# Patient Record
Sex: FEMALE | Race: WHITE | NOT HISPANIC OR LATINO | Employment: OTHER | RURAL
[De-identification: names, ages, dates, MRNs, and addresses within clinical notes are randomized per-mention and may not be internally consistent; named-entity substitution may affect disease eponyms.]

---

## 2020-11-12 ENCOUNTER — HISTORICAL (OUTPATIENT)
Dept: ADMINISTRATIVE | Facility: HOSPITAL | Age: 75
End: 2020-11-12

## 2021-08-02 ENCOUNTER — OFFICE VISIT (OUTPATIENT)
Dept: DERMATOLOGY | Facility: CLINIC | Age: 76
End: 2021-08-02
Payer: MEDICARE

## 2021-08-02 VITALS — WEIGHT: 125 LBS | HEIGHT: 62 IN | BODY MASS INDEX: 23 KG/M2 | RESPIRATION RATE: 18 BRPM

## 2021-08-02 DIAGNOSIS — L30.4 INTERTRIGO: ICD-10-CM

## 2021-08-02 DIAGNOSIS — L21.9 SEBORRHEIC DERMATITIS: ICD-10-CM

## 2021-08-02 DIAGNOSIS — L82.0 SEBORRHEIC KERATOSES, INFLAMED: ICD-10-CM

## 2021-08-02 DIAGNOSIS — L57.8 OTHER SKIN CHANGES DUE TO CHRONIC EXPOSURE TO NONIONIZING RADIATION: Primary | ICD-10-CM

## 2021-08-02 DIAGNOSIS — L85.3 XEROSIS OF SKIN: ICD-10-CM

## 2021-08-02 DIAGNOSIS — L82.1 SEBORRHEIC KERATOSES: ICD-10-CM

## 2021-08-02 DIAGNOSIS — Z85.828 HISTORY OF BASAL CELL CARCINOMA OF SKIN: ICD-10-CM

## 2021-08-02 PROCEDURE — 17110 DESTRUCTION B9 LES UP TO 14: CPT | Mod: ,,, | Performed by: DERMATOLOGY

## 2021-08-02 PROCEDURE — 17110 PR DESTRUCTION BENIGN LESIONS UP TO 14: ICD-10-PCS | Mod: ,,, | Performed by: DERMATOLOGY

## 2021-08-02 PROCEDURE — 99213 OFFICE O/P EST LOW 20 MIN: CPT | Mod: 25,,, | Performed by: DERMATOLOGY

## 2021-08-02 PROCEDURE — 99213 PR OFFICE/OUTPT VISIT, EST, LEVL III, 20-29 MIN: ICD-10-PCS | Mod: 25,,, | Performed by: DERMATOLOGY

## 2021-08-02 RX ORDER — DESONIDE 0.5 MG/G
CREAM TOPICAL
Qty: 60 G | Refills: 1 | Status: SHIPPED | OUTPATIENT
Start: 2021-08-02 | End: 2023-07-31 | Stop reason: SDUPTHER

## 2021-08-02 RX ORDER — KETOCONAZOLE 20 MG/ML
SHAMPOO, SUSPENSION TOPICAL
Qty: 120 ML | Refills: 11 | Status: SHIPPED | OUTPATIENT
Start: 2021-08-02 | End: 2023-03-01 | Stop reason: SDUPTHER

## 2022-01-10 ENCOUNTER — HOSPITAL ENCOUNTER (OUTPATIENT)
Dept: RADIOLOGY | Facility: HOSPITAL | Age: 77
Discharge: HOME | End: 2022-01-10
Payer: MEDICARE

## 2022-01-10 VITALS — WEIGHT: 130 LBS | HEIGHT: 62 IN | BODY MASS INDEX: 23.92 KG/M2

## 2022-01-10 DIAGNOSIS — Z12.31 VISIT FOR SCREENING MAMMOGRAM: ICD-10-CM

## 2022-01-10 PROCEDURE — 77067 SCR MAMMO BI INCL CAD: CPT | Mod: TC

## 2022-01-10 PROCEDURE — 77067 MAMMO DIGITAL SCREENING BILAT: ICD-10-PCS | Mod: 26,,, | Performed by: RADIOLOGY

## 2022-01-10 PROCEDURE — 77067 SCR MAMMO BI INCL CAD: CPT | Mod: 26,,, | Performed by: RADIOLOGY

## 2022-02-02 ENCOUNTER — OFFICE VISIT (OUTPATIENT)
Dept: DERMATOLOGY | Facility: CLINIC | Age: 77
End: 2022-02-02
Payer: MEDICARE

## 2022-02-02 VITALS — RESPIRATION RATE: 18 BRPM | WEIGHT: 130.06 LBS | BODY MASS INDEX: 23.93 KG/M2 | HEIGHT: 62 IN

## 2022-02-02 DIAGNOSIS — L21.9 SEBORRHEIC DERMATITIS: ICD-10-CM

## 2022-02-02 DIAGNOSIS — L57.0 AK (ACTINIC KERATOSIS): ICD-10-CM

## 2022-02-02 DIAGNOSIS — L82.0 SEBORRHEIC KERATOSES, INFLAMED: ICD-10-CM

## 2022-02-02 DIAGNOSIS — Z85.828 HISTORY OF BASAL CELL CARCINOMA (BCC): ICD-10-CM

## 2022-02-02 DIAGNOSIS — L82.1 SK (SEBORRHEIC KERATOSIS): ICD-10-CM

## 2022-02-02 DIAGNOSIS — L57.8 OTHER SKIN CHANGES DUE TO CHRONIC EXPOSURE TO NONIONIZING RADIATION: Primary | ICD-10-CM

## 2022-02-02 PROCEDURE — 17110 PR DESTRUCTION BENIGN LESIONS UP TO 14: ICD-10-PCS | Mod: ,,, | Performed by: DERMATOLOGY

## 2022-02-02 PROCEDURE — 99213 PR OFFICE/OUTPT VISIT, EST, LEVL III, 20-29 MIN: ICD-10-PCS | Mod: 25,,, | Performed by: DERMATOLOGY

## 2022-02-02 PROCEDURE — 99213 OFFICE O/P EST LOW 20 MIN: CPT | Mod: 25,,, | Performed by: DERMATOLOGY

## 2022-02-02 PROCEDURE — 17110 DESTRUCTION B9 LES UP TO 14: CPT | Mod: ,,, | Performed by: DERMATOLOGY

## 2022-02-02 NOTE — PATIENT INSTRUCTIONS
Sunscreen Recommendations   I recommended a broad spectrum sunscreen with a SPF of 30 or higher that is water-resistant. SPF 30 sunscreens block approximately 97 percent of the sun's harmful rays.    Sunscreens should be applied at least 15 minutes prior to expected sun exposure and then every 90 minutes after that as long as sun exposure continues.    If swimming or exercising sunscreen should be reapplied every 45 minutes to an hour after getting wet or sweating.   One ounce, or the equivalent of a shot glass full of sunscreen, is adequate to protect the skin not covered by a bathing suit.    I also recommended a lip balm with a sunscreen as well.   Healthy Sun Protective Behaviors   Sun protective clothing can be used in lieu of sunscreen but must be worn the entire time you are exposed to the sun's rays.   Seek shade between 10 a.m. and 2 p.m.   Use extra caution near water, snow, or sand as they reflect sun rays   Avoid tanning beds and consider sunless self-tanning products instead   Perform monthly self skin exams       Cryotherapy   There will likely be a blister.    Clean the area daily with dial antibacterial soap and water.    Apply vaseline as needed.    The area will take 1-2 weeks to heal.

## 2022-02-02 NOTE — PROGRESS NOTES
[facility]   Meena Goss MD    Patient Name: Isabel Boateng  Patient YOB: 1945   Date of Service: 2/2/22    CC: Full Skin Exam    HPI: Isabel Boateng is a 76 y.o. female presents today for a full skin exam.  Patient was last seen 08/21 and dermatologic history includes AKs and BCC. Patient is concerned today about a lesion located on the chest.  It has been present for 2 year(s). It has not been treated in the past.  Patient is also concerned about lesion on the back.    History reviewed. No pertinent past medical history.  Past Surgical History:   Procedure Laterality Date    BASAL CELL CARCINOMA EXCISION      HYSTERECTOMY      OOPHORECTOMY       Review of patient's allergies indicates:   Allergen Reactions    Streptomycin        Current Outpatient Medications:     desonide (DESOWEN) 0.05 % cream, Apply to rash under breast BID, tapering with improvement, Disp: 60 g, Rfl: 1    ketoconazole (NIZORAL) 2 % shampoo, Use as a scalp treatment 2-3 times a week for maintenance, massaging into scalp and leaving on for up to 3 minutes before rinsing, Disp: 120 mL, Rfl: 11    ROS: A focused review of systems was obtained and negative.     Exam: A full skin exam was performed including scalp, hair, face, neck, chest, back, abdomen, right arm, left arm, right hand, left hand, nails, right leg, and left leg.  All areas examined were normal expect as per below in assessment and plan.  General Appearance of the patient is well developed and well nourished.  Orientation: alert and oriented x 3.  Mood and affect: pleasant.    Assessment:   The primary encounter diagnosis was Other skin changes due to chronic exposure to nonionizing radiation. Diagnoses of SK (seborrheic keratosis), AK (actinic keratosis), Seborrheic dermatitis, History of basal cell carcinoma (BCC), and Seborrheic keratoses, inflamed were also pertinent to this visit.    Plan:      Seborrheic Keratosis (L82.1)  - Stuck-on, warty, greasy  brown papule with pseudo-horn cysts scattered on the trunk and extremities    Plan: Counseling.  I counseled the patient regarding the following:  Skin Care: Seborrheic Keratoses are benign. No treatment is necessary.  Expectations: Seborrheic Keratoses are benign warty growths. Patients get more of them as they age    Plan: Reassurance    Irritated Seborrheic Keratoses (L82.0)  Stuck-on inflamed papules with crust located on the chest  Associated diagnoses: Pruritus and Cutaneous Inflammation    Plan: Liquid Nitrogen.  A total of 2 lesions were treated with liquid nitrogen, located on the above listed location.  This procedure was medically necessary because the lesions that were treated were: irritated and itchy. The  patient's consent was obtained including but not limited to risks of crusting, scabbing, blistering, scarring, darker  or lighter pigmentary change, recurrence, incomplete removal and infection.      History of non-melanoma skin cancer (Z85.828)  - Well healed scar with NER  Associated diagnosis: Medical surveillance following completed treatment    Plan: Monitoring.  Other Skin Changes Due to Chronic Exposure of Nonionizing Radiation (L57.8)    Plan: Monitoring.     Plan: Sunscreen Recommendations.  I recommended a broad spectrum sunscreen with a SPF of 30 or higher. I explained that SPF 30 sunscreens block approximately 97 percent of the  sun's harmful rays. Sunscreens should be applied at least 15 minutes prior to expected sun exposure and then every 2 hours after that as long as  sun exposure continues. If swimming or exercising sunscreen should be reapplied every 45 minutes to an hour after getting wet or sweating. One  ounce, or the equivalent of a shot glass full of sunscreen, is adequate to protect the skin not covered by a bathing suit. I also recommended a lip  balm with a sunscreen as well. Sun protective clothing can be used in lieu of sunscreen but must be worn the entire time you are  exposed to the  sun's rays.      Follow up in about 6 months (around 8/2/2022) for Skin check.    Meena Goss MD

## 2022-02-12 ENCOUNTER — OFFICE VISIT (OUTPATIENT)
Dept: FAMILY MEDICINE | Facility: CLINIC | Age: 77
End: 2022-02-12
Payer: MEDICARE

## 2022-02-12 VITALS — BODY MASS INDEX: 23.92 KG/M2 | WEIGHT: 130 LBS | HEIGHT: 62 IN

## 2022-02-12 DIAGNOSIS — R53.83 FATIGUE, UNSPECIFIED TYPE: ICD-10-CM

## 2022-02-12 DIAGNOSIS — U07.1 COVID-19 VIRUS INFECTION: Primary | ICD-10-CM

## 2022-02-12 LAB
CTP QC/QA: YES
SARS-COV-2 AG RESP QL IA.RAPID: POSITIVE

## 2022-02-12 PROCEDURE — 99202 OFFICE O/P NEW SF 15 MIN: CPT | Mod: CR,,, | Performed by: NURSE PRACTITIONER

## 2022-02-12 PROCEDURE — 99202 PR OFFICE/OUTPT VISIT, NEW, LEVL II, 15-29 MIN: ICD-10-PCS | Mod: CR,,, | Performed by: NURSE PRACTITIONER

## 2022-02-12 PROCEDURE — 87426 SARSCOV CORONAVIRUS AG IA: CPT | Mod: RHCUB | Performed by: NURSE PRACTITIONER

## 2022-02-12 RX ORDER — CITALOPRAM 10 MG/1
10 TABLET ORAL DAILY
COMMUNITY

## 2022-02-12 RX ORDER — OMEPRAZOLE 20 MG/1
20 CAPSULE, DELAYED RELEASE ORAL DAILY
COMMUNITY

## 2022-02-12 NOTE — PATIENT INSTRUCTIONS
 Isolate at home for 5 days, except to seek medical attention. If you have no symptoms or your symptoms are resolving, you can leave your house    Continue to wear a well-fitting mask when around others for additional five days after isolation.   If you have fever (temperature 100.4 or greater), stay home until your fever resolves.   Stay in a specific room away from other people in your home. If possible, use a separate bathroom. If you must be around others, wear a mask.   Monitor your symptoms carefully. Go to the emergency room if you start showing any of these signs: trouble breathing, persistent pain or pressure in chest, confusion, difficulty staying awake or inability to wake, pale/gray/blue-colored skin, lips or nail beds.   Get rest and stay hydrated.   If you have a doctors appointment, informed you healthcare provider ahead of time that you have COVID 19.   OTC Medications  o Ibuprofen or acetaminophen as instructed for fever (temperature 100.4 and greater), and body aches/headaches.  o Aspirin 81 mg oral daiy.  o Adult multivitamin with vitamin C & D and zinc.

## 2022-02-12 NOTE — PROGRESS NOTES
[facility]    Chief Complaint      Chief Complaint   Patient presents with    Cough    Fatigue    Headache    Nasal Congestion     Took home covid test this am and it was positive      History of Present Illness      Isabel Boateng is a 76 y.o. female  who presents today for c/o URI symptoms. She reports that she had a (+) COVID 19 home test.    URI   This is a new problem. The current episode started in the past 7 days. The problem has been waxing and waning. There has been no fever. Associated symptoms include congestion, coughing and headaches. Pertinent negatives include no abdominal pain, chest pain, diarrhea, dysuria, ear pain, nausea, plugged ear sensation, rash, rhinorrhea, sinus pain, sneezing, sore throat, vomiting or wheezing. She has tried nothing for the symptoms.     Past Medical History:  History reviewed. No pertinent past medical history.    Past Surgical History:   has a past surgical history that includes Excision basal cell carcinoma; Oophorectomy; and Hysterectomy.    Social History:  Social History     Tobacco Use    Smoking status: Never Smoker    Smokeless tobacco: Never Used     I personally reviewed all past medical, surgical, and social.     Review of Systems   Constitutional: Positive for fatigue and malaise/fatigue. Negative for chills and fever.   HENT: Positive for congestion. Negative for ear pain, rhinorrhea, sinus pain, sneezing and sore throat.    Eyes: Negative for discharge.   Respiratory: Positive for cough. Negative for sputum production, shortness of breath and wheezing.    Cardiovascular: Negative for chest pain.   Gastrointestinal: Negative for abdominal pain, diarrhea, nausea and vomiting.   Genitourinary: Negative for dysuria.   Musculoskeletal: Negative for myalgias.   Skin: Negative for itching and rash.   Neurological: Positive for headaches.   Endo/Heme/Allergies: Negative for environmental allergies. Does not bruise/bleed easily.  "  Psychiatric/Behavioral: Negative for depression and suicidal ideas.      Medications:  Outpatient Encounter Medications as of 2/12/2022   Medication Sig Dispense Refill    citalopram (CELEXA) 10 MG tablet Take 10 mg by mouth once daily.      desonide (DESOWEN) 0.05 % cream Apply to rash under breast BID, tapering with improvement 60 g 1    ketoconazole (NIZORAL) 2 % shampoo Use as a scalp treatment 2-3 times a week for maintenance, massaging into scalp and leaving on for up to 3 minutes before rinsing 120 mL 11    omeprazole (PRILOSEC) 20 MG capsule Take 20 mg by mouth once daily.       No facility-administered encounter medications on file as of 2/12/2022.     Allergies:  Review of patient's allergies indicates:   Allergen Reactions    Streptomycin      Health Maintenance:    There is no immunization history on file for this patient.   Health Maintenance   Topic Date Due    Hepatitis C Screening  Never done    Lipid Panel  Never done    TETANUS VACCINE  Never done    DEXA SCAN  Never done      Physical Exam      Height and Weight  Height: 5' 2" (157.5 cm)  Weight: 59 kg (130 lb)  BSA (Calculated - sq m): 1.61 sq meters  BMI (Calculated): 23.8  Weight in (lb) to have BMI = 25: 136.4]    Physical Exam  Vitals and nursing note reviewed.   Constitutional:       General: She is not in acute distress.     Appearance: Normal appearance.   HENT:      Head: Normocephalic and atraumatic.      Right Ear: External ear normal.      Left Ear: External ear normal.      Nose: Congestion present.      Mouth/Throat:      Mouth: Mucous membranes are moist.      Pharynx: Oropharynx is clear.   Eyes:      Conjunctiva/sclera: Conjunctivae normal.      Pupils: Pupils are equal, round, and reactive to light.   Cardiovascular:      Rate and Rhythm: Normal rate and regular rhythm.      Pulses: Normal pulses.      Heart sounds: Normal heart sounds. No murmur heard.      Pulmonary:      Effort: Pulmonary effort is normal. No " respiratory distress.      Breath sounds: Normal breath sounds.   Musculoskeletal:         General: Normal range of motion.      Cervical back: Normal range of motion and neck supple.   Skin:     General: Skin is warm and dry.   Neurological:      General: No focal deficit present.      Mental Status: She is alert and oriented to person, place, and time. Mental status is at baseline.   Psychiatric:         Mood and Affect: Mood normal.         Behavior: Behavior normal.         Thought Content: Thought content normal.         Judgment: Judgment normal.        Assessment/Plan     Isabel Boateng is a 76 y.o.female with:    1. Fatigue, unspecified type  - SARS Coronavirus 2 Antigen, POCT    2. COVID-19 virus infection  COVID 19 Complications Score: 1 (Low Risk)   OTC Medications  o Ibuprofen or acetaminophen as instructed for fever (temperature 100.4 and greater), and body aches/headaches.  o Aspirin 81 mg oral daiy.  o Adult multivitamin with vitamin C & D and zinc.    Chronic conditions status updated as per HPI.  Other than changes above, cont current medications and maintain follow up with specialists.  Return to clinic as needed.    Francia Strange, KELLIEP  [facility]

## 2022-07-28 ENCOUNTER — OFFICE VISIT (OUTPATIENT)
Dept: DERMATOLOGY | Facility: CLINIC | Age: 77
End: 2022-07-28
Payer: MEDICARE

## 2022-07-28 ENCOUNTER — PROCEDURE VISIT (OUTPATIENT)
Dept: DERMATOLOGY | Facility: CLINIC | Age: 77
End: 2022-07-28
Payer: MEDICARE

## 2022-07-28 VITALS
RESPIRATION RATE: 18 BRPM | HEIGHT: 62 IN | WEIGHT: 130 LBS | RESPIRATION RATE: 18 BRPM | WEIGHT: 130 LBS | BODY MASS INDEX: 23.92 KG/M2 | BODY MASS INDEX: 23.92 KG/M2 | HEIGHT: 62 IN

## 2022-07-28 DIAGNOSIS — Z85.828 HISTORY OF NONMELANOMA SKIN CANCER: ICD-10-CM

## 2022-07-28 DIAGNOSIS — L82.1 SEBORRHEIC KERATOSES: ICD-10-CM

## 2022-07-28 DIAGNOSIS — L73.8 SEBACEOUS HYPERPLASIA: ICD-10-CM

## 2022-07-28 DIAGNOSIS — L57.8 OTHER SKIN CHANGES DUE TO CHRONIC EXPOSURE TO NONIONIZING RADIATION: Primary | ICD-10-CM

## 2022-07-28 DIAGNOSIS — L73.8 SEBACEOUS HYPERPLASIA: Primary | ICD-10-CM

## 2022-07-28 DIAGNOSIS — L82.0 SEBORRHEIC KERATOSES, INFLAMED: ICD-10-CM

## 2022-07-28 DIAGNOSIS — D22.9 BENIGN NEVUS OF SKIN: ICD-10-CM

## 2022-07-28 PROCEDURE — 99213 PR OFFICE/OUTPT VISIT, EST, LEVL III, 20-29 MIN: ICD-10-PCS | Mod: 25,,, | Performed by: DERMATOLOGY

## 2022-07-28 PROCEDURE — 99499 UNLISTED E&M SERVICE: CPT | Mod: ,,, | Performed by: DERMATOLOGY

## 2022-07-28 PROCEDURE — 99213 OFFICE O/P EST LOW 20 MIN: CPT | Mod: 25,,, | Performed by: DERMATOLOGY

## 2022-07-28 PROCEDURE — 99499 NO LOS: ICD-10-PCS | Mod: ,,, | Performed by: DERMATOLOGY

## 2022-07-28 PROCEDURE — 17110 DESTRUCTION B9 LES UP TO 14: CPT | Mod: ,,, | Performed by: DERMATOLOGY

## 2022-07-28 PROCEDURE — 17110 PR DESTRUCTION BENIGN LESIONS UP TO 14: ICD-10-PCS | Mod: ,,, | Performed by: DERMATOLOGY

## 2022-07-28 RX ORDER — TIMOLOL MALEATE 5 MG/ML
1 SOLUTION/ DROPS OPHTHALMIC EVERY MORNING
COMMUNITY
Start: 2022-06-15

## 2022-07-28 NOTE — PROGRESS NOTES
[facility]   Meena Goss MD    Patient Name: Isabel Boateng  Patient YOB: 1945   Date of Service: 7/28/22    CC: Sebaceous hyperplasia    HPI: Isabel Boateng is a 77 y.o. female here today for cosmetic treatment of sebaceous hyperplasia and seborrheic keratoses, located on the face.  These have been present for many years.  Previous treatments include none. .    History reviewed. No pertinent past medical history.  Past Surgical History:   Procedure Laterality Date    BASAL CELL CARCINOMA EXCISION      HYSTERECTOMY      OOPHORECTOMY       Review of patient's allergies indicates:   Allergen Reactions    Streptomycin        Current Outpatient Medications:     citalopram (CELEXA) 10 MG tablet, Take 10 mg by mouth once daily., Disp: , Rfl:     desonide (DESOWEN) 0.05 % cream, Apply to rash under breast BID, tapering with improvement, Disp: 60 g, Rfl: 1    ketoconazole (NIZORAL) 2 % shampoo, Use as a scalp treatment 2-3 times a week for maintenance, massaging into scalp and leaving on for up to 3 minutes before rinsing, Disp: 120 mL, Rfl: 11    omeprazole (PRILOSEC) 20 MG capsule, Take 20 mg by mouth once daily., Disp: , Rfl:     timolol maleate 0.5% (TIMOPTIC) 0.5 % Drop, Place 1 drop into both eyes every morning., Disp: , Rfl:     ROS: A focused review of systems was obtained and negative.     Exam: A focused skin exam was performed. All areas examined were normal except as mentioned in the assessment and plan below.  General Appearance of the patient is well developed and well nourished.  Orientation: alert and oriented x 3.  Mood and affect: pleasant.    Assessment:   The encounter diagnosis was Sebaceous hyperplasia.    Plan:   Sebaceous Hyperplasia  - orange-yellow papules with telangiectasia    Plan: Counseling  I counseled the patient regarding the following:  Skin Care: Sebaceous Hyperplasia can be treated with electrodesiccation or laser.  Expectations: Sebaceous Hyperplasia are  benign, enlarged oil glands within the skin.  Contact Office if: Sebaceous Hyperplasia fails to resolve with treatment.    Plan: Reassurance    Cosmetic Destruction with electrodesiccation.   Informed consent was obtained and risks including scarring, bleeding, infection, and recurrence were discussed. A price of $274.78 was quoted to the patient.     The area was prepped with Chloraprep. Anesthesia was achieved with topical 5% lidocaine cream.  15 lesions were treated with electrodesiccation. Vaseline was applied and after care instructions were given.        Seborrheic Keratosis (L82.1)  - Stuck-on, warty, greasy brown papule with pseudo-horn cysts on the face    Plan: Counseling.  I counseled the patient regarding the following:  Skin Care: Seborrheic Keratoses are benign. No treatment is necessary.  Expectations: Seborrheic Keratoses are benign warty growths. Patients get more of them as they age    Plan: Liquid Nitrogen.  A total of 7 lesions were treated with liquid nitrogen, located on the above listed location.  This procedure was done for cosmetic purposed and quote a price per above. The  patient's consent was obtained including but not limited to risks of crusting, scabbing, blistering, scarring, darker  or lighter pigmentary change, recurrence, incomplete removal and infection.        Follow up if symptoms worsen or fail to improve.    Meena Goss MD

## 2022-07-28 NOTE — PROGRESS NOTES
[facility]   Meena Goss MD    Patient Name: Isabel Boateng  Patient YOB: 1945   Date of Service: 7/28/22    CC: Full Skin Exam    HPI: Isaebl Boateng is a 77 y.o. female presents today for a full skin exam.  Patient was last seen 2/2/22 and dermatologic history includes non melanoma skin cancer. Patient is concerned today about a lesion located on the right forearm.  It has been present for 6 month(s). It has not been treated in the past.    History reviewed. No pertinent past medical history.  Past Surgical History:   Procedure Laterality Date    BASAL CELL CARCINOMA EXCISION      HYSTERECTOMY      OOPHORECTOMY       Review of patient's allergies indicates:   Allergen Reactions    Streptomycin        Current Outpatient Medications:     citalopram (CELEXA) 10 MG tablet, Take 10 mg by mouth once daily., Disp: , Rfl:     desonide (DESOWEN) 0.05 % cream, Apply to rash under breast BID, tapering with improvement, Disp: 60 g, Rfl: 1    ketoconazole (NIZORAL) 2 % shampoo, Use as a scalp treatment 2-3 times a week for maintenance, massaging into scalp and leaving on for up to 3 minutes before rinsing, Disp: 120 mL, Rfl: 11    omeprazole (PRILOSEC) 20 MG capsule, Take 20 mg by mouth once daily., Disp: , Rfl:     timolol maleate 0.5% (TIMOPTIC) 0.5 % Drop, Place 1 drop into both eyes every morning., Disp: , Rfl:     ROS: A focused review of systems was obtained and negative.     Exam: A full skin exam was performed including scalp, hair, face, neck, chest, back, abdomen, right arm, left arm, right hand, left hand, nails, right leg, and left leg.  All areas examined were normal expect as per below in assessment and plan.  General Appearance of the patient is well developed and well nourished.  Orientation: alert and oriented x 3.  Mood and affect: pleasant.    Assessment:   There were no encounter diagnoses.    Plan:      Seborrheic Keratosis (L82.1)  - Stuck-on, warty, greasy brown papule with  pseudo-horn cysts scattered on the trunk and extremities    Plan: Counseling.  I counseled the patient regarding the following:  Skin Care: Seborrheic Keratoses are benign. No treatment is necessary.  Expectations: Seborrheic Keratoses are benign warty growths. Patients get more of them as they age    Plan: Reassurance    Irritated Seborrheic Keratoses (L82.0)  Stuck-on inflamed papules with crust located on the rig forearm  Associated diagnoses: Pruritus and Cutaneous Inflammation    Plan: Liquid Nitrogen.  A total of 1 lesions were treated with liquid nitrogen, located on the above listed location.  This procedure was medically necessary because the lesions that were treated were: irritated and itchy. The  patient's consent was obtained including but not limited to risks of crusting, scabbing, blistering, scarring, darker  or lighter pigmentary change, recurrence, incomplete removal and infection.    Benign Nevus (D22.72)  - Dome shaped regular papule    Plan: Reassurance.    Plan: Counseling.  I counseled the patient regarding the following:  Instructions: Monthly self-skin checks to monitor for any changes in moles are recommended.  Expectations: Benign Nevi are pigmented nests of cells within the skin. No treatment is necessary.  Contact Office if: Any moles change in size, shape or color; itch, burn or bleed.    Sebaceous Hyperplasia  - orange-yellow papules with telangiectasia    Plan: Counseling  I counseled the patient regarding the following:  Skin Care: Sebaceous Hyperplasia can be treated with electrodesiccation or laser.  Expectations: Sebaceous Hyperplasia are benign, enlarged oil glands within the skin.  Contact Office if: Sebaceous Hyperplasia fails to resolve with treatment.    Plan: Reassurance    History of non-melanoma skin cancer (Z85.828)  - Well healed scar with NER  Associated diagnosis: Medical surveillance following completed treatment    Plan: Monitoring.  Other Skin Changes Due to Chronic  Exposure of Nonionizing Radiation (L57.8)    Plan: Monitoring.     Plan: Sunscreen Recommendations.  I recommended a broad spectrum sunscreen with a SPF of 30 or higher. I explained that SPF 30 sunscreens block approximately 97 percent of the  sun's harmful rays. Sunscreens should be applied at least 15 minutes prior to expected sun exposure and then every 2 hours after that as long as  sun exposure continues. If swimming or exercising sunscreen should be reapplied every 45 minutes to an hour after getting wet or sweating. One  ounce, or the equivalent of a shot glass full of sunscreen, is adequate to protect the skin not covered by a bathing suit. I also recommended a lip  balm with a sunscreen as well. Sun protective clothing can be used in lieu of sunscreen but must be worn the entire time you are exposed to the  sun's rays.      Follow up in about 6 months (around 1/28/2023) for Skin Check.    Meena Goss MD

## 2022-08-25 ENCOUNTER — OFFICE VISIT (OUTPATIENT)
Dept: DERMATOLOGY | Facility: CLINIC | Age: 77
End: 2022-08-25
Payer: MEDICARE

## 2022-08-25 VITALS — BODY MASS INDEX: 23.92 KG/M2 | HEIGHT: 62 IN | RESPIRATION RATE: 18 BRPM | WEIGHT: 130 LBS

## 2022-08-25 DIAGNOSIS — L73.8 SEBACEOUS HYPERPLASIA: Primary | ICD-10-CM

## 2022-08-25 PROCEDURE — 99499 NO LOS: ICD-10-PCS | Mod: ,,, | Performed by: DERMATOLOGY

## 2022-08-25 PROCEDURE — 99499 UNLISTED E&M SERVICE: CPT | Mod: ,,, | Performed by: DERMATOLOGY

## 2022-08-25 RX ORDER — MINERAL OIL
ENEMA (ML) RECTAL
COMMUNITY
Start: 2022-04-14

## 2022-08-25 NOTE — PROGRESS NOTES
[facility]   Meena Goss MD    Patient Name: Isabel Boateng  Patient YOB: 1945   Date of Service: 8/25/22    CC: Follow-up Sebaceous Hyperplasia     HPI: Isabel Boateng is a 77 y.o. female here today for follow-up of seb hyperplasia , last seen 7/28/22.  Previous treatments include cosmetic Destruction with electrodesiccation .  Overall, the seb hyperplasia is improved.  Treatment plan was followed as directed.    History reviewed. No pertinent past medical history.  Past Surgical History:   Procedure Laterality Date    BASAL CELL CARCINOMA EXCISION      HYSTERECTOMY      OOPHORECTOMY       Review of patient's allergies indicates:   Allergen Reactions    Streptomycin        Current Outpatient Medications:     fexofenadine (ALLEGRA ALLERGY) 180 MG tablet, Allegra Allergy 180 MG Oral Tablet QTY: 90 tablet Days: 90 Refills: 3  Written: 04/14/22 Patient Instructions: once a day, Disp: , Rfl:     citalopram (CELEXA) 10 MG tablet, Take 10 mg by mouth once daily., Disp: , Rfl:     desonide (DESOWEN) 0.05 % cream, Apply to rash under breast BID, tapering with improvement, Disp: 60 g, Rfl: 1    ketoconazole (NIZORAL) 2 % shampoo, Use as a scalp treatment 2-3 times a week for maintenance, massaging into scalp and leaving on for up to 3 minutes before rinsing, Disp: 120 mL, Rfl: 11    omeprazole (PRILOSEC) 20 MG capsule, Take 20 mg by mouth once daily., Disp: , Rfl:     timolol maleate 0.5% (TIMOPTIC) 0.5 % Drop, Place 1 drop into both eyes every morning., Disp: , Rfl:     ROS: A focused review of systems was obtained and negative.     Exam: A focused skin exam was performed. All areas examined were normal except as mentioned in the assessment and plan below.  General Appearance of the patient is well developed and well nourished.  Orientation: alert and oriented x 3.  Mood and affect: pleasant.    Assessment:   The encounter diagnosis was Sebaceous hyperplasia.    Plan:      Sebaceous  Hyperplasia  - improved     Plan: Counseling  I counseled the patient regarding the following:  Skin Care: Sebaceous Hyperplasia can be treated with electrodesiccation or laser.  Expectations: Sebaceous Hyperplasia are benign, enlarged oil glands within the skin.  Contact Office if: Sebaceous Hyperplasia fails to resolve with treatment.    Plan: Reassurance    - pt has paid for cosmetic destruction and this is a no charge visit for follow up      Follow up if symptoms worsen or fail to improve.    Meena Goss MD

## 2022-12-12 ENCOUNTER — OFFICE VISIT (OUTPATIENT)
Dept: DERMATOLOGY | Facility: CLINIC | Age: 77
End: 2022-12-12
Payer: MEDICARE

## 2022-12-12 DIAGNOSIS — L23.89 ALLERGIC CONTACT DERMATITIS DUE TO OTHER AGENTS: Primary | ICD-10-CM

## 2022-12-12 PROCEDURE — 99214 PR OFFICE/OUTPT VISIT, EST, LEVL IV, 30-39 MIN: ICD-10-PCS | Mod: ,,, | Performed by: DERMATOLOGY

## 2022-12-12 PROCEDURE — 99214 OFFICE O/P EST MOD 30 MIN: CPT | Mod: ,,, | Performed by: DERMATOLOGY

## 2022-12-12 RX ORDER — TRIAMCINOLONE ACETONIDE 0.25 MG/G
OINTMENT TOPICAL
Qty: 80 G | Refills: 3 | Status: SHIPPED | OUTPATIENT
Start: 2022-12-12

## 2022-12-12 RX ORDER — PREDNISONE 10 MG/1
TABLET ORAL
Qty: 20 TABLET | Refills: 0 | Status: SHIPPED | OUTPATIENT
Start: 2022-12-12

## 2022-12-12 RX ORDER — TRIAMCINOLONE ACETONIDE 0.25 MG/G
CREAM TOPICAL
Qty: 80 G | Refills: 2 | Status: CANCELLED | OUTPATIENT
Start: 2022-12-12

## 2022-12-12 NOTE — PROGRESS NOTES
[facility]   Meena Goss MD    Patient Name: Isabel Boateng  Patient YOB: 1945   Date of Service: 12/12/22    CC: Rash    HPI: Isabel Boateng is a 77 y.o. female here today for rash, located on the face, ears, and neck.  Rash has been present for 2 weeks.  Previous treatments include desonide.     History reviewed. No pertinent past medical history.  Past Surgical History:   Procedure Laterality Date    BASAL CELL CARCINOMA EXCISION      HYSTERECTOMY      OOPHORECTOMY       Review of patient's allergies indicates:   Allergen Reactions    Streptomycin        Current Outpatient Medications:     citalopram (CELEXA) 10 MG tablet, Take 10 mg by mouth once daily., Disp: , Rfl:     desonide (DESOWEN) 0.05 % cream, Apply to rash under breast BID, tapering with improvement, Disp: 60 g, Rfl: 1    fexofenadine (ALLEGRA ALLERGY) 180 MG tablet, Allegra Allergy 180 MG Oral Tablet QTY: 90 tablet Days: 90 Refills: 3  Written: 04/14/22 Patient Instructions: once a day, Disp: , Rfl:     ketoconazole (NIZORAL) 2 % shampoo, Use as a scalp treatment 2-3 times a week for maintenance, massaging into scalp and leaving on for up to 3 minutes before rinsing, Disp: 120 mL, Rfl: 11    omeprazole (PRILOSEC) 20 MG capsule, Take 20 mg by mouth once daily., Disp: , Rfl:     predniSONE (DELTASONE) 10 MG tablet, Take 4 tab PO daily x1 day, 3 tabs PO daily x2 days, 2 tabs PO daily x3 days, and 1 tab PO daily x4 day, Disp: 20 tablet, Rfl: 0    timolol maleate 0.5% (TIMOPTIC) 0.5 % Drop, Place 1 drop into both eyes every morning., Disp: , Rfl:     triamcinolone acetonide 0.025% (KENALOG) 0.025 % Oint, Apply to AA on face BID PRN flares tapering with improvement, Disp: 80 g, Rfl: 3    ROS: A focused review of systems was obtained and negative.     Exam: A focused skin exam was performed. All areas examined were normal except as mentioned in the assessment and plan below.  General Appearance of the patient is well developed and well  nourished.  Orientation: alert and oriented x 3.  Mood and affect: pleasant.    Assessment:   The encounter diagnosis was Allergic contact dermatitis due to other agents.    Plan:   Allergic Contact Dermatitis (L23.89)  - Well demarcated, geometric eczematous patches  Status: Inadequately controlled     Plan: Counseling.  I counseled the patient regarding the following:  Skin care: Patient should use hypoallergenic products such as unscented soaps. Eliminate exposure to all new  cosmetics, fragrances, hair products, nail products shampoos, scented soaps, plants, metals and sunscreens.  Expectations: Allergic contact dermatitis can persist for several weeks before it fully resolves. Sometimes, patch  testing is necessary if reactions persist or if the patient is in contact with several potential allergens.  Contact office if: Allergic contact dermatitis worsens or fails to improve despite several weeks of treatment.    - will start prednisone and propylene glycol free topical steroid  - pt will stop personal care products and start vanicream products   - will consider patch testing if needed  in the future     Medications Ordered This Encounter   Medications    predniSONE (DELTASONE) 10 MG tablet     Sig: Take 4 tab PO daily x1 day, 3 tabs PO daily x2 days, 2 tabs PO daily x3 days, and 1 tab PO daily x4 day     Dispense:  20 tablet     Refill:  0    triamcinolone acetonide 0.025% (KENALOG) 0.025 % Oint     Sig: Apply to AA on face BID PRN flares tapering with improvement     Dispense:  80 g     Refill:  3     Please dispense perrigo brand for propylene glycol free formulation       Follow up if symptoms worsen or fail to improve.    Meena Goss MD

## 2023-01-30 ENCOUNTER — OFFICE VISIT (OUTPATIENT)
Dept: DERMATOLOGY | Facility: CLINIC | Age: 78
End: 2023-01-30
Payer: MEDICARE

## 2023-01-30 DIAGNOSIS — L23.9 ALLERGIC CONTACT DERMATITIS, UNSPECIFIED TRIGGER: ICD-10-CM

## 2023-01-30 DIAGNOSIS — L82.1 SK (SEBORRHEIC KERATOSIS): ICD-10-CM

## 2023-01-30 DIAGNOSIS — Z85.828 HISTORY OF NONMELANOMA SKIN CANCER: ICD-10-CM

## 2023-01-30 DIAGNOSIS — L85.3 XEROSIS OF SKIN: ICD-10-CM

## 2023-01-30 DIAGNOSIS — L57.8 OTHER SKIN CHANGES DUE TO CHRONIC EXPOSURE TO NONIONIZING RADIATION: Primary | ICD-10-CM

## 2023-01-30 PROCEDURE — 99213 OFFICE O/P EST LOW 20 MIN: CPT | Mod: ,,, | Performed by: DERMATOLOGY

## 2023-01-30 PROCEDURE — 99213 PR OFFICE/OUTPT VISIT, EST, LEVL III, 20-29 MIN: ICD-10-PCS | Mod: ,,, | Performed by: DERMATOLOGY

## 2023-01-30 NOTE — PROGRESS NOTES
[facility]   Meena Goss MD    Patient Name: Isabel Boateng  Patient YOB: 1945   Date of Service: 1/30/23    CC: Full Skin Exam    HPI: Isabel Boateng is a 77 y.o. female presents today for a full skin exam.  Patient was last seen 07/22 and dermatologic history includes NMSC. Patient has no concerns at this time.    History reviewed. No pertinent past medical history.  Past Surgical History:   Procedure Laterality Date    BASAL CELL CARCINOMA EXCISION      HYSTERECTOMY      OOPHORECTOMY       Review of patient's allergies indicates:   Allergen Reactions    Streptomycin        Current Outpatient Medications:     citalopram (CELEXA) 10 MG tablet, Take 10 mg by mouth once daily., Disp: , Rfl:     desonide (DESOWEN) 0.05 % cream, Apply to rash under breast BID, tapering with improvement, Disp: 60 g, Rfl: 1    fexofenadine (ALLEGRA ALLERGY) 180 MG tablet, Allegra Allergy 180 MG Oral Tablet QTY: 90 tablet Days: 90 Refills: 3  Written: 04/14/22 Patient Instructions: once a day, Disp: , Rfl:     ketoconazole (NIZORAL) 2 % shampoo, Use as a scalp treatment 2-3 times a week for maintenance, massaging into scalp and leaving on for up to 3 minutes before rinsing, Disp: 120 mL, Rfl: 11    omeprazole (PRILOSEC) 20 MG capsule, Take 20 mg by mouth once daily., Disp: , Rfl:     predniSONE (DELTASONE) 10 MG tablet, Take 4 tab PO daily x1 day, 3 tabs PO daily x2 days, 2 tabs PO daily x3 days, and 1 tab PO daily x4 day, Disp: 20 tablet, Rfl: 0    timolol maleate 0.5% (TIMOPTIC) 0.5 % Drop, Place 1 drop into both eyes every morning., Disp: , Rfl:     triamcinolone acetonide 0.025% (KENALOG) 0.025 % Oint, Apply to AA on face BID PRN flares tapering with improvement, Disp: 80 g, Rfl: 3    ROS: A focused review of systems was obtained and negative.     Exam: A full skin exam was performed including scalp, hair, face, neck, chest, back, abdomen, right arm, left arm, right hand, left hand, nails, right leg, and left  leg.  All areas examined were normal expect as per below in assessment and plan.  General Appearance of the patient is well developed and well nourished.  Orientation: alert and oriented x 3.  Mood and affect: pleasant.    Assessment:   The primary encounter diagnosis was Other skin changes due to chronic exposure to nonionizing radiation. Diagnoses of SK (seborrheic keratosis), History of nonmelanoma skin cancer, Allergic contact dermatitis, unspecified trigger, and Xerosis of skin were also pertinent to this visit.    Plan:        Seborrheic Keratosis (L82.1)  - Stuck-on, warty, greasy brown papule with pseudo-horn cysts scattered on the trunk and extremities    Plan: Counseling.  I counseled the patient regarding the following:  Skin Care: Seborrheic Keratoses are benign. No treatment is necessary.  Expectations: Seborrheic Keratoses are benign warty growths. Patients get more of them as they age    Plan: Reassurance      Allergic Contact Dermatitis (L23.89)  - clear  Status: stable    Plan: Counseling.  I counseled the patient regarding the following:  Skin care: Patient should use hypoallergenic products such as unscented soaps. Eliminate exposure to all new  cosmetics, fragrances, hair products, nail products shampoos, scented soaps, plants, metals and sunscreens.  Expectations: Allergic contact dermatitis can persist for several weeks before it fully resolves. Sometimes, patch  testing is necessary if reactions persist or if the patient is in contact with several potential allergens.  Contact office if: Allergic contact dermatitis worsens or fails to improve despite several weeks of treatment.    Xerosis  - dry skin    Plan: Counseling  I counseled the patient regarding the following:  Skin Care: Dry skin can be improved by bathing with a moisturizing/unscented soap, and moisturizing after showering.  Expectations: Dry skin is easily managed with moisturizers, but can recur.  Contact Office if: Xerosis fails  to improve despite months of treatment.    I recommended the following:  Moisturizers    - Samples of CeraVe SA cream given    History of non-melanoma skin cancer (Z85.828)  - Well healed scar with NER  Associated diagnosis: Medical surveillance following completed treatment    Plan: Monitoring.    Other Skin Changes Due to Chronic Exposure of Nonionizing Radiation (L57.8)    Plan: Monitoring.     Plan: Sunscreen Recommendations.  I recommended a broad spectrum sunscreen with a SPF of 30 or higher. I explained that SPF 30 sunscreens block approximately 97 percent of the  sun's harmful rays. Sunscreens should be applied at least 15 minutes prior to expected sun exposure and then every 2 hours after that as long as  sun exposure continues. If swimming or exercising sunscreen should be reapplied every 45 minutes to an hour after getting wet or sweating. One  ounce, or the equivalent of a shot glass full of sunscreen, is adequate to protect the skin not covered by a bathing suit. I also recommended a lip  balm with a sunscreen as well. Sun protective clothing can be used in lieu of sunscreen but must be worn the entire time you are exposed to the  sun's rays.    Follow up in about 6 months (around 7/30/2023) for FSE.    Meena Goss MD

## 2023-02-15 ENCOUNTER — HOSPITAL ENCOUNTER (OUTPATIENT)
Dept: RADIOLOGY | Facility: HOSPITAL | Age: 78
Discharge: HOME | End: 2023-02-15
Payer: MEDICARE

## 2023-02-15 DIAGNOSIS — Z12.31 VISIT FOR SCREENING MAMMOGRAM: ICD-10-CM

## 2023-02-15 PROCEDURE — 77067 MAMMO DIGITAL SCREENING BILAT: ICD-10-PCS | Mod: 26,,, | Performed by: RADIOLOGY

## 2023-02-15 PROCEDURE — 77067 SCR MAMMO BI INCL CAD: CPT | Mod: TC

## 2023-02-15 PROCEDURE — 77067 SCR MAMMO BI INCL CAD: CPT | Mod: 26,,, | Performed by: RADIOLOGY

## 2023-07-31 ENCOUNTER — OFFICE VISIT (OUTPATIENT)
Dept: DERMATOLOGY | Facility: CLINIC | Age: 78
End: 2023-07-31
Payer: MEDICARE

## 2023-07-31 DIAGNOSIS — L57.8 OTHER SKIN CHANGES DUE TO CHRONIC EXPOSURE TO NONIONIZING RADIATION: Primary | ICD-10-CM

## 2023-07-31 DIAGNOSIS — L30.4 INTERTRIGO: ICD-10-CM

## 2023-07-31 DIAGNOSIS — Z85.828 HISTORY OF NONMELANOMA SKIN CANCER: ICD-10-CM

## 2023-07-31 DIAGNOSIS — L21.9 SEBORRHEIC DERMATITIS: ICD-10-CM

## 2023-07-31 PROCEDURE — 99214 PR OFFICE/OUTPT VISIT, EST, LEVL IV, 30-39 MIN: ICD-10-PCS | Mod: ,,, | Performed by: DERMATOLOGY

## 2023-07-31 PROCEDURE — 99214 OFFICE O/P EST MOD 30 MIN: CPT | Mod: ,,, | Performed by: DERMATOLOGY

## 2023-07-31 RX ORDER — KETOCONAZOLE 20 MG/ML
SHAMPOO, SUSPENSION TOPICAL
Qty: 120 ML | Refills: 11 | Status: SHIPPED | OUTPATIENT
Start: 2023-07-31

## 2023-07-31 RX ORDER — CLOBETASOL PROPIONATE 0.46 MG/ML
SOLUTION TOPICAL
Qty: 50 ML | Refills: 5 | Status: SHIPPED | OUTPATIENT
Start: 2023-07-31

## 2023-07-31 RX ORDER — DESONIDE 0.5 MG/G
CREAM TOPICAL
Qty: 60 G | Refills: 3 | Status: SHIPPED | OUTPATIENT
Start: 2023-07-31

## 2023-07-31 RX ORDER — KETOCONAZOLE 20 MG/G
CREAM TOPICAL
Qty: 60 G | Refills: 11 | Status: SHIPPED | OUTPATIENT
Start: 2023-07-31

## 2023-07-31 NOTE — PROGRESS NOTES
[facility]   Meena Goss MD    Patient Name: Isabel Boateng  Patient YOB: 1945   Date of Service: 7/31/23    CC: Full Skin Exam    HPI: Isabel Boateng is a 78 y.o. female presents today for a full skin exam.  Patient was last seen 01/30/2023 and dermatologic history includes NMSC. Patient is concerned today about the Seborrheic dermatitis located on the scalp, nose, and ears.  It has been present for  many  year(s). It has been treated in the past. Previous treatments include ketoconazole shampoo and clobetasol solution.  Patient is also concerned about rash located under the breast and groin previously treated with desonide.    History reviewed. No pertinent past medical history.  Past Surgical History:   Procedure Laterality Date    BASAL CELL CARCINOMA EXCISION      HYSTERECTOMY      OOPHORECTOMY       Review of patient's allergies indicates:   Allergen Reactions    Streptomycin        Current Outpatient Medications:     citalopram (CELEXA) 10 MG tablet, Take 10 mg by mouth once daily., Disp: , Rfl:     clobetasoL (TEMOVATE) 0.05 % external solution, Apply to AA on scalp BID PRN flares tapering with improvement, Disp: 50 mL, Rfl: 5    desonide (DESOWEN) 0.05 % cream, Apply to rash under breast BID, tapering with improvement, Disp: 60 g, Rfl: 3    fexofenadine (ALLEGRA ALLERGY) 180 MG tablet, Allegra Allergy 180 MG Oral Tablet QTY: 90 tablet Days: 90 Refills: 3  Written: 04/14/22 Patient Instructions: once a day, Disp: , Rfl:     ketoconazole (NIZORAL) 2 % cream, Apply to AA on body once a day for prevention, Disp: 60 g, Rfl: 11    ketoconazole (NIZORAL) 2 % shampoo, Use as a scalp treatment 2-3 times a week for maintenance, massaging into scalp and leaving on for up to 3 minutes before rinsing, Disp: 120 mL, Rfl: 11    omeprazole (PRILOSEC) 20 MG capsule, Take 20 mg by mouth once daily., Disp: , Rfl:     predniSONE (DELTASONE) 10 MG tablet, Take 4 tab PO daily x1 day, 3 tabs PO daily x2  days, 2 tabs PO daily x3 days, and 1 tab PO daily x4 day, Disp: 20 tablet, Rfl: 0    timolol maleate 0.5% (TIMOPTIC) 0.5 % Drop, Place 1 drop into both eyes every morning., Disp: , Rfl:     triamcinolone acetonide 0.025% (KENALOG) 0.025 % Oint, Apply to AA on face BID PRN flares tapering with improvement, Disp: 80 g, Rfl: 3    ROS: A focused review of systems was obtained and negative.     Exam: A full skin exam was performed including scalp, hair, face, neck, chest, back, abdomen, right arm, left arm, right hand, left hand, nails, right leg, and left leg.  All areas examined were normal expect as per below in assessment and plan.  General Appearance of the patient is well developed and well nourished.  Orientation: alert and oriented x 3.  Mood and affect: pleasant.    Assessment:   The primary encounter diagnosis was Other skin changes due to chronic exposure to nonionizing radiation. Diagnoses of Intertrigo, Seborrheic dermatitis, and History of nonmelanoma skin cancer were also pertinent to this visit.    Plan:   Medications Ordered This Encounter   Medications    clobetasoL (TEMOVATE) 0.05 % external solution     Sig: Apply to AA on scalp BID PRN flares tapering with improvement     Dispense:  50 mL     Refill:  5    desonide (DESOWEN) 0.05 % cream     Sig: Apply to rash under breast BID, tapering with improvement     Dispense:  60 g     Refill:  3    ketoconazole (NIZORAL) 2 % cream     Sig: Apply to AA on body once a day for prevention     Dispense:  60 g     Refill:  11    ketoconazole (NIZORAL) 2 % shampoo     Sig: Use as a scalp treatment 2-3 times a week for maintenance, massaging into scalp and leaving on for up to 3 minutes before rinsing     Dispense:  120 mL     Refill:  11     Other Skin Changes Due to Chronic Exposure of Nonionizing Radiation (L57.8)    Plan: Monitoring.     Plan: Sunscreen Recommendations.  I recommended a broad spectrum sunscreen with a SPF of 30 or higher. I explained that SPF 30  sunscreens block approximately 97 percent of the  sun's harmful rays. Sunscreens should be applied at least 15 minutes prior to expected sun exposure and then every 2 hours after that as long as  sun exposure continues. If swimming or exercising sunscreen should be reapplied every 45 minutes to an hour after getting wet or sweating. One  ounce, or the equivalent of a shot glass full of sunscreen, is adequate to protect the skin not covered by a bathing suit. I also recommended a lip  balm with a sunscreen as well. Sun protective clothing can be used in lieu of sunscreen but must be worn the entire time you are exposed to the  sun's rays.    Intertrigo (L30.4)  - Weepy macerated erythematous patches  Status: Inadequately controlled       Plan: Counseling.  I counseled the patient regarding the following:  Skin care: Intertrigo responds to absorbent or topical antifungal powders to reduce friction and moisture, and low  potency topical steroids to reduce inflammation.  Expectations: Intertrigo is a rash that occurs with frictional rubbing of skin in warm and moist environments. Common intertriginous locations  include: inframammary skin, inguinal folds, abdominal folds.  Contact office if: Rash continues to spread despite treatment.    -will start ketoconazole cream for prevention and continue desonide PRN flares     Seborrheic Dermatitis (L21.8)  - Pink/orange scaly plaques located on the scalp    Status: Inadequately controlled      Plan: Counseling.  I counseled the patient regarding the following:  Skin care: Emollients, shampoos with tar, selenium or zinc pyrithione can improve seborrheic dermatitis.  Expectations: Seborrheic Dermatitis is chronic in nature with periods of remissions and flares. Flares can be  triggered by stress.  Contact office if: Seborrheic dermatitis worsens, or fails to improve despite several months of treatment.    Plan: Prescription   -will continue keto shampoo and clobetasol solution      History of non-melanoma skin cancer (Z85.828)  - Well healed scar with NER  Associated diagnosis: Medical surveillance following completed treatment    Plan: Monitoring.      Follow up in about 6 months (around 1/31/2024) for fse.    Meena Goss MD

## 2024-03-27 ENCOUNTER — HOSPITAL ENCOUNTER (OUTPATIENT)
Dept: RADIOLOGY | Facility: HOSPITAL | Age: 79
Discharge: HOME | End: 2024-03-27
Payer: MEDICARE

## 2024-03-27 DIAGNOSIS — Z12.31 VISIT FOR SCREENING MAMMOGRAM: ICD-10-CM

## 2024-03-27 PROCEDURE — 77063 BREAST TOMOSYNTHESIS BI: CPT | Mod: 26,,, | Performed by: RADIOLOGY

## 2024-03-27 PROCEDURE — 77067 SCR MAMMO BI INCL CAD: CPT | Mod: 26,,, | Performed by: RADIOLOGY

## 2024-03-27 PROCEDURE — 77063 BREAST TOMOSYNTHESIS BI: CPT | Mod: TC

## 2024-08-01 ENCOUNTER — OFFICE VISIT (OUTPATIENT)
Dept: DERMATOLOGY | Facility: CLINIC | Age: 79
End: 2024-08-01
Payer: MEDICARE

## 2024-08-01 DIAGNOSIS — L21.9 SEBORRHEIC DERMATITIS: ICD-10-CM

## 2024-08-01 DIAGNOSIS — Z85.828 HISTORY OF NONMELANOMA SKIN CANCER: ICD-10-CM

## 2024-08-01 DIAGNOSIS — L82.1 SK (SEBORRHEIC KERATOSIS): ICD-10-CM

## 2024-08-01 DIAGNOSIS — L30.4 INTERTRIGO: ICD-10-CM

## 2024-08-01 DIAGNOSIS — L57.8 OTHER SKIN CHANGES DUE TO CHRONIC EXPOSURE TO NONIONIZING RADIATION: Primary | ICD-10-CM

## 2024-08-01 NOTE — PROGRESS NOTES
[facility]   Meena Goss MD    Patient Name: Isabel Boateng  Patient YOB: 1945   Date of Service: 8/1/24    CC: Full Skin Exam    HPI: Isabel Boateng is a 79 y.o. female presents today for a full skin exam.  Patient was last seen 07/23 and dermatologic history includes Aks and NMSC. Patient is concerned today about a lesion located on the posterior neck.  It has been present for 4 month(s). It has not been treated in the past.  Patient is also concerned about a lesion on the forehead, chest, and left hand.    History reviewed. No pertinent past medical history.  Past Surgical History:   Procedure Laterality Date    BASAL CELL CARCINOMA EXCISION      HYSTERECTOMY      OOPHORECTOMY       Review of patient's allergies indicates:   Allergen Reactions    Streptomycin        Current Outpatient Medications:     citalopram (CELEXA) 10 MG tablet, Take 10 mg by mouth once daily., Disp: , Rfl:     clobetasoL (TEMOVATE) 0.05 % external solution, Apply to AA on scalp BID PRN flares tapering with improvement, Disp: 50 mL, Rfl: 5    desonide (DESOWEN) 0.05 % cream, Apply to rash under breast BID, tapering with improvement, Disp: 60 g, Rfl: 3    fexofenadine (ALLEGRA ALLERGY) 180 MG tablet, Allegra Allergy 180 MG Oral Tablet QTY: 90 tablet Days: 90 Refills: 3  Written: 04/14/22 Patient Instructions: once a day, Disp: , Rfl:     ketoconazole (NIZORAL) 2 % cream, Apply to AA on body once a day for prevention, Disp: 60 g, Rfl: 11    ketoconazole (NIZORAL) 2 % shampoo, Use as a scalp treatment 2-3 times a week for maintenance, massaging into scalp and leaving on for up to 3 minutes before rinsing, Disp: 120 mL, Rfl: 11    omeprazole (PRILOSEC) 20 MG capsule, Take 20 mg by mouth once daily., Disp: , Rfl:     predniSONE (DELTASONE) 10 MG tablet, Take 4 tab PO daily x1 day, 3 tabs PO daily x2 days, 2 tabs PO daily x3 days, and 1 tab PO daily x4 day, Disp: 20 tablet, Rfl: 0    timolol maleate 0.5% (TIMOPTIC) 0.5 %  Drop, Place 1 drop into both eyes every morning., Disp: , Rfl:     triamcinolone acetonide 0.025% (KENALOG) 0.025 % Oint, Apply to AA on face BID PRN flares tapering with improvement, Disp: 80 g, Rfl: 3    ROS: A focused review of systems was obtained and negative.     Exam: A full skin exam was performed including scalp, hair, face, neck, chest, back, abdomen, right arm, left arm, right hand, left hand, nails, right leg, and left leg.  All areas examined were normal expect as per below in assessment and plan.  General Appearance of the patient is well developed and well nourished.  Orientation: alert and oriented x 3.  Mood and affect: pleasant.    Assessment:   The primary encounter diagnosis was Other skin changes due to chronic exposure to nonionizing radiation. Diagnoses of SK (seborrheic keratosis), Seborrheic dermatitis, Intertrigo, and History of nonmelanoma skin cancer were also pertinent to this visit.    Plan:        Seborrheic Keratosis (L82.1)  - Stuck-on, warty, greasy brown papule with pseudo-horn cysts scattered on the trunk and extremities    Plan: Counseling.  I counseled the patient regarding the following:  Skin Care: Seborrheic Keratoses are benign. No treatment is necessary.  Expectations: Seborrheic Keratoses are benign warty growths. Patients get more of them as they age    Plan: Reassurance      Cherry Angiomas  - Scattered bright pink papules    Plan: Counseling  I counseled the patient regarding the diagnosis including that cherry angiomas are benign skin growths requiring no treatment. Patients get more as they age.      Seborrheic Dermatitis  - clear    Status: stable      Plan: Counseling.  I counseled the patient regarding the following:  Skin care: Emollients, shampoos with tar, selenium or zinc pyrithione can improve seborrheic dermatitis.  Expectations: Seborrheic Dermatitis is chronic in nature with periods of remissions and flares. Flares can be  triggered by stress.  Contact  office if: Seborrheic dermatitis worsens, or fails to improve despite several months of treatment.        Intertrigo (L30.4)  - clear  Status: stable    Plan: Counseling.  I counseled the patient regarding the following:  Skin care: Intertrigo responds to absorbent or topical antifungal powders to reduce friction and moisture, and low  potency topical steroids to reduce inflammation.  Expectations: Intertrigo is a rash that occurs with frictional rubbing of skin in warm and moist environments. Common intertriginous locations  include: inframammary skin, inguinal folds, abdominal folds.  Contact office if: Rash continues to spread despite treatment.      History of non-melanoma skin cancer (Z85.828)  - Well healed scar with NER  Associated diagnosis: Medical surveillance following completed treatment    Plan: Monitoring.      Other Skin Changes Due to Chronic Exposure of Nonionizing Radiation (L57.8)    Plan: Monitoring.     Plan: Sunscreen Recommendations.  I recommended a broad spectrum sunscreen with a SPF of 30 or higher. I explained that SPF 30 sunscreens block approximately 97 percent of the  sun's harmful rays. Sunscreens should be applied at least 15 minutes prior to expected sun exposure and then every 2 hours after that as long as  sun exposure continues. If swimming or exercising sunscreen should be reapplied every 45 minutes to an hour after getting wet or sweating. One  ounce, or the equivalent of a shot glass full of sunscreen, is adequate to protect the skin not covered by a bathing suit. I also recommended a lip  balm with a sunscreen as well. Sun protective clothing can be used in lieu of sunscreen but must be worn the entire time you are exposed to the  sun's rays.    Follow up in about 1 year (around 8/1/2025).    Meena Goss MD

## 2024-12-11 DIAGNOSIS — L21.9 SEBORRHEIC DERMATITIS: ICD-10-CM

## 2024-12-11 RX ORDER — KETOCONAZOLE 20 MG/ML
SHAMPOO, SUSPENSION TOPICAL
Qty: 120 ML | Refills: 11 | Status: SHIPPED | OUTPATIENT
Start: 2024-12-11

## 2025-05-20 ENCOUNTER — HOSPITAL ENCOUNTER (OUTPATIENT)
Dept: RADIOLOGY | Facility: HOSPITAL | Age: 80
Discharge: HOME | End: 2025-05-20
Attending: RADIOLOGY
Payer: MEDICARE

## 2025-05-20 DIAGNOSIS — Z12.31 SCREENING MAMMOGRAM FOR BREAST CANCER: ICD-10-CM

## 2025-05-20 PROCEDURE — 77063 BREAST TOMOSYNTHESIS BI: CPT | Mod: 26,,, | Performed by: RADIOLOGY

## 2025-05-20 PROCEDURE — 77067 SCR MAMMO BI INCL CAD: CPT | Mod: 26,,, | Performed by: RADIOLOGY

## 2025-05-20 PROCEDURE — 77063 BREAST TOMOSYNTHESIS BI: CPT | Mod: TC

## 2025-08-04 ENCOUNTER — OFFICE VISIT (OUTPATIENT)
Dept: DERMATOLOGY | Facility: CLINIC | Age: 80
End: 2025-08-04
Payer: MEDICARE

## 2025-08-04 DIAGNOSIS — D48.5 NEOPLASM OF UNCERTAIN BEHAVIOR OF SKIN: ICD-10-CM

## 2025-08-04 DIAGNOSIS — L57.0 ACTINIC KERATOSES: ICD-10-CM

## 2025-08-04 DIAGNOSIS — L57.8 OTHER SKIN CHANGES DUE TO CHRONIC EXPOSURE TO NONIONIZING RADIATION: Primary | ICD-10-CM

## 2025-08-04 DIAGNOSIS — L82.1 SEBORRHEIC KERATOSES: ICD-10-CM

## 2025-08-04 PROCEDURE — 88304 TISSUE EXAM BY PATHOLOGIST: CPT | Mod: 26,,, | Performed by: PATHOLOGY

## 2025-08-04 PROCEDURE — 99213 OFFICE O/P EST LOW 20 MIN: CPT | Mod: 25,,, | Performed by: DERMATOLOGY

## 2025-08-04 PROCEDURE — 17000 DESTRUCT PREMALG LESION: CPT | Mod: XS,,, | Performed by: DERMATOLOGY

## 2025-08-04 PROCEDURE — 11102 TANGNTL BX SKIN SINGLE LES: CPT | Mod: ,,, | Performed by: DERMATOLOGY

## 2025-08-04 PROCEDURE — 88304 TISSUE EXAM BY PATHOLOGIST: CPT | Mod: TC,SUR | Performed by: DERMATOLOGY

## 2025-08-04 NOTE — PROGRESS NOTES
[facility]   Meena Goss MD    Patient Name: Isabel Boateng  Patient YOB: 1945   Date of Service: 8/4/25    CC: Full Skin Exam    HPI: Isabel Baoteng is a 80 y.o. female presents today for a full skin exam.  Patient was last seen 8/2024 and dermatologic history includes Aks and NMSC. Patient is concerned today about a lesion located on the right wrist.  It has been present for 1 year(s). It has not been treated in the past.  Patient is also concerned about lesion on the back and a lesion on the right thumb.    History reviewed. No pertinent past medical history.  Past Surgical History:   Procedure Laterality Date    BASAL CELL CARCINOMA EXCISION      HYSTERECTOMY      OOPHORECTOMY       Review of patient's allergies indicates:   Allergen Reactions    Streptomycin      Current Medications[1]    ROS: A focused review of systems was obtained and negative.     Exam: A full skin exam was performed including scalp, hair, face, neck, chest, back, abdomen, right arm, left arm, right hand, left hand, nails, right leg, and left leg.  All areas examined were normal expect as per below in assessment and plan.  General Appearance of the patient is well developed and well nourished.  Orientation: alert and oriented x 3.  Mood and affect: pleasant.    Assessment:   The primary encounter diagnosis was Other skin changes due to chronic exposure to nonionizing radiation. Diagnoses of Neoplasm of uncertain behavior of skin, Actinic keratoses, and Seborrheic keratoses were also pertinent to this visit.    Plan:   Seborrheic Keratosis (L82.1)  - Stuck-on, warty, greasy brown papule with pseudo-horn cysts scattered on the trunk and extremities    Plan: Counseling.  I counseled the patient regarding the following:  Skin Care: Seborrheic Keratoses are benign. No treatment is necessary.  Expectations: Seborrheic Keratoses are benign warty growths. Patients get more of them as they age    Plan: Reassurance    Other Skin  Changes Due to Chronic Exposure of Nonionizing Radiation (L57.8)    Plan: Monitoring.     Plan: Sunscreen Recommendations.  I recommended a broad spectrum sunscreen with a SPF of 30 or higher. I explained that SPF 30 sunscreens block approximately 97 percent of the  sun's harmful rays. Sunscreens should be applied at least 15 minutes prior to expected sun exposure and then every 2 hours after that as long as  sun exposure continues. If swimming or exercising sunscreen should be reapplied every 45 minutes to an hour after getting wet or sweating. One  ounce, or the equivalent of a shot glass full of sunscreen, is adequate to protect the skin not covered by a bathing suit. I also recommended a lip  balm with a sunscreen as well. Sun protective clothing can be used in lieu of sunscreen but must be worn the entire time you are exposed to the  sun's rays.    Actinic Keratoses(L57.0)  - Erythematous patches and papules with hyperkeratotic scale distributed on the right wrist.    Plan: Counseling.  I counseled the patient regarding the following:  Skin Care: Sun protective clothing and broad spectrum sunscreen can prevent the formation of Actinic  Keratoses. AKs can resolve with cryotherapy, photodynamic therapy, imiquimod, topical 5-FU.  Expectations: Actinic Keratoses are precancerous proliferations that occur within sun damaged skin. If untreated,  a small subset of AKs can develop into Squamous Cell Carcinoma.  Contact Office if: If AKs fail to resolve despite treatment, or if you develop a side effect from therapy, such as  unbearable crusting, scabbing, redness and tenderness.    Plan: Liquid Nitrogen.  A total of 1 lesions were treated with liquid nitrogen for 2 freeze-thaw cycles lasting 5 seconds, located on the above locations.   The patient's consent was obtained including but not limited to risks of crusting, scabbing,  blistering, scarring, darker or lighter pigmentary change, recurrence, incomplete removal  and infection.    Neoplasm of Uncertain Behavior (D48.5)  - white papule located on the right dorsal thumb  Ddx includes: cyst    Plan: Counseling.  I counseled the patient regarding the following:  Instructions: Neoplasms of Uncertain Behavior can be observed, biopsied or surgically removed depending on the  level of clinical suspicion.  Instructions: Neoplasms of Uncertain Behavior can be observed, biopsied or surgically removed depending on the  level of clinical suspicion.  Contact Office if: patient develops any new lesions that fail to heal, ulcerate or bleed.    Plan: Biopsy by Shave Method.  Location (1): right dorsal thumb  Written consent was obtained and risks were reviewed including but not  limited to scarring, infection, bleeding, scabbing, incomplete removal, nerve damage and allergy to anesthesia.  The area was prepped with Chloraprep. Local anesthesia was obtained with approximately 0.5cc of 1% lidocaine  with epinephrine. A biopsy by shave method to the level of the dermis (sent for H and E) was performed using  a Dermablade on the above location. Aluminum chloride was used for hemostasis. Following the biopsy  Petrolatum and a bandage were applied. Patient will be notified of biopsy results. However, patient instructed to  call the office if not contacted within 2 weeks.           Follow up in about 1 year (around 8/4/2026).    Meena Goss MD          [1]   Current Outpatient Medications:     citalopram (CELEXA) 10 MG tablet, Take 10 mg by mouth once daily., Disp: , Rfl:     clobetasoL (TEMOVATE) 0.05 % external solution, Apply to AA on scalp BID PRN flares tapering with improvement, Disp: 50 mL, Rfl: 5    desonide (DESOWEN) 0.05 % cream, Apply to rash under breast BID, tapering with improvement, Disp: 60 g, Rfl: 3    fexofenadine (ALLEGRA ALLERGY) 180 MG tablet, Allegra Allergy 180 MG Oral Tablet QTY: 90 tablet Days: 90 Refills: 3  Written: 04/14/22 Patient Instructions: once a day, Disp: , Rfl:      ketoconazole (NIZORAL) 2 % cream, Apply to AA on body once a day for prevention, Disp: 60 g, Rfl: 11    ketoconazole (NIZORAL) 2 % shampoo, Use as a scalp treatment 2-3 times a week for maintenance, massaging into scalp and leaving on for up to 3 minutes before rinsing, Disp: 120 mL, Rfl: 11    omeprazole (PRILOSEC) 20 MG capsule, Take 20 mg by mouth once daily., Disp: , Rfl:     predniSONE (DELTASONE) 10 MG tablet, Take 4 tab PO daily x1 day, 3 tabs PO daily x2 days, 2 tabs PO daily x3 days, and 1 tab PO daily x4 day, Disp: 20 tablet, Rfl: 0    timolol maleate 0.5% (TIMOPTIC) 0.5 % Drop, Place 1 drop into both eyes every morning., Disp: , Rfl:     triamcinolone acetonide 0.025% (KENALOG) 0.025 % Oint, Apply to AA on face BID PRN flares tapering with improvement, Disp: 80 g, Rfl: 3

## 2025-08-06 LAB
DHEA SERPL-MCNC: NORMAL
ESTROGEN SERPL-MCNC: NORMAL PG/ML
INSULIN SERPL-ACNC: NORMAL U[IU]/ML
LAB AP GROSS DESCRIPTION: NORMAL
LAB AP LABORATORY NOTES: NORMAL
LAB AP SPEC A DDX: NORMAL
LAB AP SPEC A MORPHOLOGY: NORMAL
LAB AP SPEC A PROCEDURE: NORMAL
T3RU NFR SERPL: NORMAL %